# Patient Record
Sex: MALE | Race: ASIAN | NOT HISPANIC OR LATINO | URBAN - METROPOLITAN AREA
[De-identification: names, ages, dates, MRNs, and addresses within clinical notes are randomized per-mention and may not be internally consistent; named-entity substitution may affect disease eponyms.]

---

## 2017-04-19 ENCOUNTER — EMERGENCY (EMERGENCY)
Facility: HOSPITAL | Age: 57
LOS: 1 days | Discharge: HOME | End: 2017-04-19
Admitting: EMERGENCY MEDICINE
Payer: MEDICAID

## 2017-04-19 VITALS
RESPIRATION RATE: 16 BRPM | HEART RATE: 57 BPM | SYSTOLIC BLOOD PRESSURE: 139 MMHG | DIASTOLIC BLOOD PRESSURE: 92 MMHG | TEMPERATURE: 98 F | OXYGEN SATURATION: 98 %

## 2017-04-19 VITALS
DIASTOLIC BLOOD PRESSURE: 100 MMHG | RESPIRATION RATE: 18 BRPM | SYSTOLIC BLOOD PRESSURE: 157 MMHG | HEART RATE: 66 BPM | OXYGEN SATURATION: 100 % | TEMPERATURE: 98 F

## 2017-04-19 LAB
ALBUMIN SERPL ELPH-MCNC: 4.4 G/DL — SIGNIFICANT CHANGE UP (ref 3.3–5)
ALP SERPL-CCNC: 65 U/L — SIGNIFICANT CHANGE UP (ref 40–120)
ALT FLD-CCNC: 34 U/L — SIGNIFICANT CHANGE UP (ref 4–41)
APPEARANCE UR: CLEAR — SIGNIFICANT CHANGE UP
AST SERPL-CCNC: 35 U/L — SIGNIFICANT CHANGE UP (ref 4–40)
BASE EXCESS BLDV CALC-SCNC: 4.4 MMOL/L — SIGNIFICANT CHANGE UP
BASOPHILS # BLD AUTO: 0.02 K/UL — SIGNIFICANT CHANGE UP (ref 0–0.2)
BASOPHILS NFR BLD AUTO: 0.3 % — SIGNIFICANT CHANGE UP (ref 0–2)
BILIRUB SERPL-MCNC: 0.4 MG/DL — SIGNIFICANT CHANGE UP (ref 0.2–1.2)
BILIRUB UR-MCNC: NEGATIVE — SIGNIFICANT CHANGE UP
BLOOD GAS VENOUS - CREATININE: 1 MG/DL — SIGNIFICANT CHANGE UP (ref 0.5–1.3)
BLOOD UR QL VISUAL: NEGATIVE — SIGNIFICANT CHANGE UP
BUN SERPL-MCNC: 15 MG/DL — SIGNIFICANT CHANGE UP (ref 7–23)
CALCIUM SERPL-MCNC: 9.8 MG/DL — SIGNIFICANT CHANGE UP (ref 8.4–10.5)
CHLORIDE BLDV-SCNC: 109 MMOL/L — HIGH (ref 96–108)
CHLORIDE SERPL-SCNC: 99 MMOL/L — SIGNIFICANT CHANGE UP (ref 98–107)
CO2 SERPL-SCNC: 25 MMOL/L — SIGNIFICANT CHANGE UP (ref 22–31)
COLOR SPEC: SIGNIFICANT CHANGE UP
CREAT SERPL-MCNC: 1.1 MG/DL — SIGNIFICANT CHANGE UP (ref 0.5–1.3)
EOSINOPHIL # BLD AUTO: 0.14 K/UL — SIGNIFICANT CHANGE UP (ref 0–0.5)
EOSINOPHIL NFR BLD AUTO: 2 % — SIGNIFICANT CHANGE UP (ref 0–6)
GAS PNL BLDV: 133 MMOL/L — LOW (ref 136–146)
GLUCOSE BLDV-MCNC: 87 — SIGNIFICANT CHANGE UP (ref 70–99)
GLUCOSE SERPL-MCNC: 84 MG/DL — SIGNIFICANT CHANGE UP (ref 70–99)
GLUCOSE UR-MCNC: NEGATIVE — SIGNIFICANT CHANGE UP
HCO3 BLDV-SCNC: 27 MMOL/L — SIGNIFICANT CHANGE UP (ref 20–27)
HCT VFR BLD CALC: 49.6 % — SIGNIFICANT CHANGE UP (ref 39–50)
HCT VFR BLDV CALC: 52.1 % — HIGH (ref 39–51)
HGB BLD-MCNC: 16.5 G/DL — SIGNIFICANT CHANGE UP (ref 13–17)
HGB BLDV-MCNC: 17 G/DL — SIGNIFICANT CHANGE UP (ref 13–17)
IMM GRANULOCYTES NFR BLD AUTO: 0.1 % — SIGNIFICANT CHANGE UP (ref 0–1.5)
KETONES UR-MCNC: NEGATIVE — SIGNIFICANT CHANGE UP
LACTATE BLDV-MCNC: 2.1 MMOL/L — HIGH (ref 0.5–2)
LEUKOCYTE ESTERASE UR-ACNC: NEGATIVE — SIGNIFICANT CHANGE UP
LIDOCAIN IGE QN: 57 U/L — SIGNIFICANT CHANGE UP (ref 7–60)
LYMPHOCYTES # BLD AUTO: 2.26 K/UL — SIGNIFICANT CHANGE UP (ref 1–3.3)
LYMPHOCYTES # BLD AUTO: 31.8 % — SIGNIFICANT CHANGE UP (ref 13–44)
MCHC RBC-ENTMCNC: 28 PG — SIGNIFICANT CHANGE UP (ref 27–34)
MCHC RBC-ENTMCNC: 33.3 % — SIGNIFICANT CHANGE UP (ref 32–36)
MCV RBC AUTO: 84.1 FL — SIGNIFICANT CHANGE UP (ref 80–100)
MONOCYTES # BLD AUTO: 0.6 K/UL — SIGNIFICANT CHANGE UP (ref 0–0.9)
MONOCYTES NFR BLD AUTO: 8.4 % — SIGNIFICANT CHANGE UP (ref 2–14)
NEUTROPHILS # BLD AUTO: 4.08 K/UL — SIGNIFICANT CHANGE UP (ref 1.8–7.4)
NEUTROPHILS NFR BLD AUTO: 57.4 % — SIGNIFICANT CHANGE UP (ref 43–77)
NITRITE UR-MCNC: NEGATIVE — SIGNIFICANT CHANGE UP
PCO2 BLDV: 49 MMHG — SIGNIFICANT CHANGE UP (ref 41–51)
PH BLDV: 7.39 PH — SIGNIFICANT CHANGE UP (ref 7.32–7.43)
PH UR: 6.5 — SIGNIFICANT CHANGE UP (ref 4.6–8)
PLATELET # BLD AUTO: 254 K/UL — SIGNIFICANT CHANGE UP (ref 150–400)
PMV BLD: 10.1 FL — SIGNIFICANT CHANGE UP (ref 7–13)
PO2 BLDV: 68 MMHG — HIGH (ref 35–40)
POTASSIUM BLDV-SCNC: 4.2 MMOL/L — SIGNIFICANT CHANGE UP (ref 3.4–4.5)
POTASSIUM SERPL-MCNC: 4.9 MMOL/L — SIGNIFICANT CHANGE UP (ref 3.5–5.3)
POTASSIUM SERPL-SCNC: 4.9 MMOL/L — SIGNIFICANT CHANGE UP (ref 3.5–5.3)
PROT SERPL-MCNC: 7.1 G/DL — SIGNIFICANT CHANGE UP (ref 6–8.3)
PROT UR-MCNC: NEGATIVE — SIGNIFICANT CHANGE UP
RBC # BLD: 5.9 M/UL — HIGH (ref 4.2–5.8)
RBC # FLD: 13 % — SIGNIFICANT CHANGE UP (ref 10.3–14.5)
RBC CASTS # UR COMP ASSIST: SIGNIFICANT CHANGE UP (ref 0–?)
SAO2 % BLDV: 92.4 % — HIGH (ref 60–85)
SODIUM SERPL-SCNC: 141 MMOL/L — SIGNIFICANT CHANGE UP (ref 135–145)
SP GR SPEC: 1.01 — SIGNIFICANT CHANGE UP (ref 1–1.03)
UROBILINOGEN FLD QL: NORMAL E.U. — SIGNIFICANT CHANGE UP (ref 0.1–0.2)
WBC # BLD: 7.11 K/UL — SIGNIFICANT CHANGE UP (ref 3.8–10.5)
WBC # FLD AUTO: 7.11 K/UL — SIGNIFICANT CHANGE UP (ref 3.8–10.5)
WBC UR QL: SIGNIFICANT CHANGE UP (ref 0–?)

## 2017-04-19 PROCEDURE — 99284 EMERGENCY DEPT VISIT MOD MDM: CPT

## 2017-04-19 PROCEDURE — 74177 CT ABD & PELVIS W/CONTRAST: CPT | Mod: 26

## 2017-04-19 RX ORDER — KETOROLAC TROMETHAMINE 30 MG/ML
15 SYRINGE (ML) INJECTION ONCE
Qty: 0 | Refills: 0 | Status: DISCONTINUED | OUTPATIENT
Start: 2017-04-19 | End: 2017-04-19

## 2017-04-19 RX ORDER — SODIUM CHLORIDE 9 MG/ML
1000 INJECTION INTRAMUSCULAR; INTRAVENOUS; SUBCUTANEOUS ONCE
Qty: 0 | Refills: 0 | Status: COMPLETED | OUTPATIENT
Start: 2017-04-19 | End: 2017-04-19

## 2017-04-19 RX ADMIN — Medication 15 MILLIGRAM(S): at 17:59

## 2017-04-19 RX ADMIN — Medication 15 MILLIGRAM(S): at 20:22

## 2017-04-19 RX ADMIN — SODIUM CHLORIDE 1000 MILLILITER(S): 9 INJECTION INTRAMUSCULAR; INTRAVENOUS; SUBCUTANEOUS at 20:22

## 2017-04-19 NOTE — ED PROVIDER NOTE - OBJECTIVE STATEMENT
56 yo M PMHx of HTN, HLD, here for lower abdominal pain x 2 days. Pt reports for the past two days he has had lower abdominal pain and mild dysuria. He went to see his PMD 5 days ago who rx'ed metronidazole and tetracycline s/p a +breath test for h. pylori, also gave miralax for constipation. Pt states he has been able to since have bowel movements. Last BM was this AM which was normal. Pt reports pain feels like burning, intermittent. Non radiating. No meds taken to help the pain. Pt also mentions that he was off from work () for days and went back to work yesterday and this morning woke up with a "sore lower back". Pt reports this has happened in the past with work and typically resolves on its own. Denies  fever, chills, vomiting, diarrhea, weakness, HA, CP, SOB,  hematuria, blood in stool, dizziness, weakness. 58 yo M PMHx of HTN, HLD, here for lower abdominal pain x 2 days. Pt reports for the past two days he has had lower abdominal pain, mostly on the lower R side, and mild dysuria. He went to see his PMD 5 days ago who rx'ed metronidazole and tetracycline s/p a +breath test for h. pylori, also gave miralax for constipation. Pt states he has been able to since have bowel movements. Last BM was this AM which was normal. Pt reports pain feels like burning, intermittent, and mostly on the right lower side of his abdomen. Non radiating. No meds taken to help the pain. Pt also mentions that he was off from work () for days and went back to work yesterday and this morning woke up with a "sore lower back". Pt reports this has happened in the past with work and typically resolves on its own. Denies  fever, chills, vomiting, diarrhea, weakness, HA, CP, SOB,  hematuria, blood in stool, dizziness, weakness.

## 2017-04-19 NOTE — ED PROVIDER NOTE - GASTROINTESTINAL, MLM
mild suprapubic tenderness, Abdomen COMPLETELY soft, non-tender, no guarding. BS x 4+ mild suprapubic tenderness, Abdomen COMPLETELY soft, minimal RLQ and LLQ tenderness, no guarding. BS x 4+

## 2017-04-19 NOTE — ED ADULT TRIAGE NOTE - CHIEF COMPLAINT QUOTE
pt saw pmd for constipation last week and was given miralax with good results--pt c/o lower abdominal pain and elysia he has infection  in his intestine and was given metronidazole and tetracycline--pt states he still has lower abdominal pain -mainly on right side

## 2017-04-19 NOTE — ED PROVIDER NOTE - CARE PLAN
Principal Discharge DX:	Abdominal pain  Instructions for follow-up, activity and diet:	Follow up with gastroenterology within 48 hours, referral list given. Take copy of results with you. Return to ER for fever, chills, worsening pain, nausea, vomiting, diarrhea, bloody stools or urine, weakness or any other concerns. Principal Discharge DX:	Abdominal pain  Instructions for follow-up, activity and diet:	Follow up with gastroenterology within 48 hours, referral list given. Continue taking your medications  as prescribed to you by your physician. Take copy of results with you. Return to ER for fever, chills, worsening pain, nausea, vomiting, diarrhea, bloody stools or urine, weakness or any other concerns.

## 2017-04-19 NOTE — ED PROVIDER NOTE - PROGRESS NOTE DETAILS
VALERIE Sexton: labs reassuring, pt doing well. pending CT. pt will be signed out to overnight PA. VALERIE Espinoza - Received sign out from VALERIE Sexton. Awaiting CT results. VALERIE Espinoza - Received sign out from VALERIE Sexton. Awaiting CT results. Pt in NAD, resting comfortably in RW. Abdomen: non-tender, non-distending, no guarding, no rebound, no CVA tenderness. denies any dysuria, chills. Will continue to follow. PA Gerasimou - CT negative. denies any abdominal pain, N/V/D, urinary complaints. exam benign. pt states he is amenable for d/c home with GI follow up. VALERIE Najera agrees stable for d/c.

## 2017-04-19 NOTE — ED PROVIDER NOTE - MUSCULOSKELETAL, MLM
Back non-tender, no midline tenderness, no palpable step offs, FROM, able to ambulate, Spine appears normal, range of motion is not limited, no muscle or joint tenderness

## 2017-04-19 NOTE — ED PROVIDER NOTE - PHYSICAL EXAMINATION
NEURO: EOMi, PERRLA, visual fields intact, tongue midline, negative romberg, strength 5/5 UE and LE bilaterally, normal gait, facial expressions intact, point to point intact, rapid alternating movements intact, sensate intact to UE and LE bilaterally. NVI. Normal gait.

## 2017-04-19 NOTE — ED PROVIDER NOTE - PLAN OF CARE
Follow up with gastroenterology within 48 hours, referral list given. Take copy of results with you. Return to ER for fever, chills, worsening pain, nausea, vomiting, diarrhea, bloody stools or urine, weakness or any other concerns. Follow up with gastroenterology within 48 hours, referral list given. Continue taking your medications  as prescribed to you by your physician. Take copy of results with you. Return to ER for fever, chills, worsening pain, nausea, vomiting, diarrhea, bloody stools or urine, weakness or any other concerns.

## 2017-04-19 NOTE — ED PROVIDER NOTE - MEDICAL DECISION MAKING DETAILS
58 yo M pmhx of htn hld here for suprapubic pain and dysuria. Pt otherwise doing well. CC for abdominal pain however on exam patients abdomen is benign but he has mild suprapubic tenderness. NO RLQ tenderness, no systemic symptoms. Back pain is mild, ambulating in ED, no neuro deficits or injury, no fever. PLAN: labs, urine, reassess.

## 2017-04-19 NOTE — ED ADULT NURSE REASSESSMENT NOTE - SYMPTOMS
states abd pain is better now "mild", abd. slightly firm and non tender, states pain comes and goes in RLQ. pt also c.o. back pain separate from abd pain. denies fevers, vomiting and diarrhea. vs as documented. given gastroview in prep for CT/abdominal pain

## 2017-04-21 LAB
BACTERIA UR CULT: SIGNIFICANT CHANGE UP
SPECIMEN SOURCE: SIGNIFICANT CHANGE UP

## 2018-11-19 ENCOUNTER — EMERGENCY (EMERGENCY)
Facility: HOSPITAL | Age: 58
LOS: 1 days | Discharge: HOME | End: 2018-11-19
Attending: EMERGENCY MEDICINE | Admitting: EMERGENCY MEDICINE
Payer: MEDICAID

## 2018-11-19 VITALS
RESPIRATION RATE: 16 BRPM | HEART RATE: 67 BPM | SYSTOLIC BLOOD PRESSURE: 177 MMHG | DIASTOLIC BLOOD PRESSURE: 90 MMHG | TEMPERATURE: 98 F | OXYGEN SATURATION: 100 %

## 2018-11-19 LAB
ALBUMIN SERPL ELPH-MCNC: 4.3 G/DL — SIGNIFICANT CHANGE UP (ref 3.3–5)
ALP SERPL-CCNC: 72 U/L — SIGNIFICANT CHANGE UP (ref 40–120)
ALT FLD-CCNC: 27 U/L — SIGNIFICANT CHANGE UP (ref 4–41)
AST SERPL-CCNC: 21 U/L — SIGNIFICANT CHANGE UP (ref 4–40)
BASOPHILS # BLD AUTO: 0.04 K/UL — SIGNIFICANT CHANGE UP (ref 0–0.2)
BASOPHILS NFR BLD AUTO: 0.5 % — SIGNIFICANT CHANGE UP (ref 0–2)
BILIRUB SERPL-MCNC: 0.4 MG/DL — SIGNIFICANT CHANGE UP (ref 0.2–1.2)
BUN SERPL-MCNC: 15 MG/DL — SIGNIFICANT CHANGE UP (ref 7–23)
CALCIUM SERPL-MCNC: 9.5 MG/DL — SIGNIFICANT CHANGE UP (ref 8.4–10.5)
CHLORIDE SERPL-SCNC: 103 MMOL/L — SIGNIFICANT CHANGE UP (ref 98–107)
CO2 SERPL-SCNC: 25 MMOL/L — SIGNIFICANT CHANGE UP (ref 22–31)
CREAT SERPL-MCNC: 0.98 MG/DL — SIGNIFICANT CHANGE UP (ref 0.5–1.3)
EOSINOPHIL # BLD AUTO: 0.17 K/UL — SIGNIFICANT CHANGE UP (ref 0–0.5)
EOSINOPHIL NFR BLD AUTO: 2.2 % — SIGNIFICANT CHANGE UP (ref 0–6)
GLUCOSE SERPL-MCNC: 117 MG/DL — HIGH (ref 70–99)
HCT VFR BLD CALC: 51.9 % — HIGH (ref 39–50)
HGB BLD-MCNC: 16.7 G/DL — SIGNIFICANT CHANGE UP (ref 13–17)
IMM GRANULOCYTES # BLD AUTO: 0.02 # — SIGNIFICANT CHANGE UP
IMM GRANULOCYTES NFR BLD AUTO: 0.3 % — SIGNIFICANT CHANGE UP (ref 0–1.5)
LYMPHOCYTES # BLD AUTO: 2.18 K/UL — SIGNIFICANT CHANGE UP (ref 1–3.3)
LYMPHOCYTES # BLD AUTO: 28.2 % — SIGNIFICANT CHANGE UP (ref 13–44)
MCHC RBC-ENTMCNC: 27.3 PG — SIGNIFICANT CHANGE UP (ref 27–34)
MCHC RBC-ENTMCNC: 32.2 % — SIGNIFICANT CHANGE UP (ref 32–36)
MCV RBC AUTO: 84.8 FL — SIGNIFICANT CHANGE UP (ref 80–100)
MONOCYTES # BLD AUTO: 0.57 K/UL — SIGNIFICANT CHANGE UP (ref 0–0.9)
MONOCYTES NFR BLD AUTO: 7.4 % — SIGNIFICANT CHANGE UP (ref 2–14)
NEUTROPHILS # BLD AUTO: 4.74 K/UL — SIGNIFICANT CHANGE UP (ref 1.8–7.4)
NEUTROPHILS NFR BLD AUTO: 61.4 % — SIGNIFICANT CHANGE UP (ref 43–77)
NRBC # FLD: 0 — SIGNIFICANT CHANGE UP
PLATELET # BLD AUTO: 274 K/UL — SIGNIFICANT CHANGE UP (ref 150–400)
PMV BLD: 10.2 FL — SIGNIFICANT CHANGE UP (ref 7–13)
POTASSIUM SERPL-MCNC: 3.8 MMOL/L — SIGNIFICANT CHANGE UP (ref 3.5–5.3)
POTASSIUM SERPL-SCNC: 3.8 MMOL/L — SIGNIFICANT CHANGE UP (ref 3.5–5.3)
PROT SERPL-MCNC: 7.4 G/DL — SIGNIFICANT CHANGE UP (ref 6–8.3)
RBC # BLD: 6.12 M/UL — HIGH (ref 4.2–5.8)
RBC # FLD: 12.6 % — SIGNIFICANT CHANGE UP (ref 10.3–14.5)
SODIUM SERPL-SCNC: 142 MMOL/L — SIGNIFICANT CHANGE UP (ref 135–145)
WBC # BLD: 7.72 K/UL — SIGNIFICANT CHANGE UP (ref 3.8–10.5)
WBC # FLD AUTO: 7.72 K/UL — SIGNIFICANT CHANGE UP (ref 3.8–10.5)

## 2018-11-19 PROCEDURE — 70498 CT ANGIOGRAPHY NECK: CPT | Mod: 26

## 2018-11-19 PROCEDURE — 99284 EMERGENCY DEPT VISIT MOD MDM: CPT

## 2018-11-19 PROCEDURE — 70496 CT ANGIOGRAPHY HEAD: CPT | Mod: 26

## 2018-11-19 NOTE — ED ADULT TRIAGE NOTE - CHIEF COMPLAINT QUOTE
pt comes to ED for HA and dizziness  x 3 days. pt states when he lays down he feels like the room is spinning. pt has hx of HTN pt took BP meds today. pt complains of blurry vision. pt BP elevated otherwise VSS

## 2018-11-19 NOTE — ED PROVIDER NOTE - OBJECTIVE STATEMENT
58 year old male with htn dm hld presents with 3 days of vision changes and right neck pain. Was playing with son when he turned his neck right and began feeling pain 58 year old male with htn dm hld presents with 3 days of vision changes and right neck pain. Was playing with son when he turned his neck right and began feeling pain in his right neck. He also began having 10-15 second episodes of "room spinning" vision that would self resolve. These sx continued for 3 days whenever moving his neck, and began having persistent right neck pain today. First time having this pain, denies any recent trauma.    Denies any weakness, sensory changes, LOC. Takes his BP meds and is mostly compliant. 58 year old male with htn (on multiple meds, takes irregularly), dm, hld, presents with 3 days of dizziness when turning neck to right (not when turning with entire trunk). Was playing with son when he turned his neck right and began feeling pain in his right neck. He also began having 10-15 second episodes of "room spinning" vision that would self resolve after returning neck to neutral position. These sx continued for 3 days whenever moving his neck, and began having persistent right neck pain today. First time having this pain, denies any recent trauma. No LOC.  NO N/V/d.  No visino changes despite triage note. This has never happened before.

## 2018-11-19 NOTE — ED PROVIDER NOTE - PROGRESS NOTE DETAILS
Still asympt.  CT unrevealed except for spinal stenosis as reported.  Will refer to spine center for f/u. Still asympt.  CT unrevealed except for spinal stenosis as reported.  Will refer to spine center for f/u. also, to see neuro regarding vertigo.  Understands he may need further eval incl possibly an MRI.  Meclizine rx sent PRN.

## 2018-11-19 NOTE — ED ADULT NURSE NOTE - OBJECTIVE STATEMENT
pt received to intake room 6, ambulatory, c/o right sided headache and dizziness when turning head x3 days. pt states dizziness is worse in the morning, and worse when turning head to the right. pt states these dizzy spells are like "the room is spinning" and are accompanied by seeing multiple of the objects that are in front of him. pt denies nausea, chest pain, SOB. PMH HTN, states BP is not normally this high and he took his BP meds this AM. IV placed, labs sent, pending CT.

## 2018-11-19 NOTE — ED PROVIDER NOTE - ATTENDING CONTRIBUTION TO CARE
Attending Attestation: Dr. Jennings  I have personally performed a history and physical examination of the patient and discussed management with the resident as well as the patient.  I reviewed the resident's note and agree with the documented findings and plan of care.  I have authored and modified critical sections of the Provider Note, including but not limited to HPI, Physical Exam and MDM. 58 year old male with poorly controlled HTN HLD DM presenting with right neck pain and room spinning sensation. Suggestive of BPPV, but need to evaluate for possible vertebral artery dissection given highly positional and reproducible findings after turning neck. Would CTA neck and head, due to nature of pain and neuro changes. Will get basic labs. Reassess  . Asymptomatic at present.

## 2018-11-19 NOTE — ED PROVIDER NOTE - MEDICAL DECISION MAKING DETAILS
58 year old male with HTN HLD DM presenting with right neck pain and room spinning blurry vision. Suggestive of BPPV, but need to evaluate for possible carotid artery dissection with CTa neck and head, due to nature of pain and neuro changes. Will get basic labs. If negative, will give meclizine for BPPV with outpatient f/u. 58 year old male with poorly controlled HTN HLD DM presenting with right neck pain and room spinning sensation. Suggestive of BPPV, but need to evaluate for possible vertebral artery dissection given highly positional and reproducible findings after turning neck. Would CTA neck and head, due to nature of pain and neuro changes. Will get basic labs. Reassess  . Asymptomatic at present.

## 2018-11-20 VITALS
TEMPERATURE: 98 F | RESPIRATION RATE: 18 BRPM | OXYGEN SATURATION: 100 % | SYSTOLIC BLOOD PRESSURE: 150 MMHG | HEART RATE: 60 BPM | DIASTOLIC BLOOD PRESSURE: 90 MMHG

## 2018-11-20 RX ORDER — MECLIZINE HCL 12.5 MG
1 TABLET ORAL
Qty: 10 | Refills: 0 | OUTPATIENT
Start: 2018-11-20

## 2022-07-18 ENCOUNTER — EMERGENCY (EMERGENCY)
Facility: HOSPITAL | Age: 62
LOS: 1 days | Discharge: HOME | End: 2022-07-18
Attending: EMERGENCY MEDICINE | Admitting: EMERGENCY MEDICINE

## 2022-07-18 VITALS
TEMPERATURE: 98 F | SYSTOLIC BLOOD PRESSURE: 154 MMHG | OXYGEN SATURATION: 98 % | DIASTOLIC BLOOD PRESSURE: 82 MMHG | RESPIRATION RATE: 18 BRPM | HEART RATE: 79 BPM

## 2022-07-18 VITALS
DIASTOLIC BLOOD PRESSURE: 92 MMHG | RESPIRATION RATE: 16 BRPM | TEMPERATURE: 98 F | SYSTOLIC BLOOD PRESSURE: 137 MMHG | HEART RATE: 54 BPM | OXYGEN SATURATION: 100 %

## 2022-07-18 LAB
A1C WITH ESTIMATED AVERAGE GLUCOSE RESULT: 6.1 % — HIGH (ref 4–5.6)
ALBUMIN SERPL ELPH-MCNC: 4.4 G/DL — SIGNIFICANT CHANGE UP (ref 3.3–5)
ALP SERPL-CCNC: 65 U/L — SIGNIFICANT CHANGE UP (ref 40–120)
ALT FLD-CCNC: 38 U/L — SIGNIFICANT CHANGE UP (ref 4–41)
ANION GAP SERPL CALC-SCNC: 11 MMOL/L — SIGNIFICANT CHANGE UP (ref 7–14)
APTT BLD: 33.2 SEC — SIGNIFICANT CHANGE UP (ref 27–36.3)
AST SERPL-CCNC: 24 U/L — SIGNIFICANT CHANGE UP (ref 4–40)
BILIRUB SERPL-MCNC: 0.3 MG/DL — SIGNIFICANT CHANGE UP (ref 0.2–1.2)
BUN SERPL-MCNC: 17 MG/DL — SIGNIFICANT CHANGE UP (ref 7–23)
CALCIUM SERPL-MCNC: 9.5 MG/DL — SIGNIFICANT CHANGE UP (ref 8.4–10.5)
CHLORIDE SERPL-SCNC: 103 MMOL/L — SIGNIFICANT CHANGE UP (ref 98–107)
CO2 SERPL-SCNC: 27 MMOL/L — SIGNIFICANT CHANGE UP (ref 22–31)
CREAT SERPL-MCNC: 1.09 MG/DL — SIGNIFICANT CHANGE UP (ref 0.5–1.3)
EGFR: 77 ML/MIN/1.73M2 — SIGNIFICANT CHANGE UP
ESTIMATED AVERAGE GLUCOSE: 128 — SIGNIFICANT CHANGE UP
GLUCOSE SERPL-MCNC: 106 MG/DL — HIGH (ref 70–99)
HCT VFR BLD CALC: 49.2 % — SIGNIFICANT CHANGE UP (ref 39–50)
HGB BLD-MCNC: 15.9 G/DL — SIGNIFICANT CHANGE UP (ref 13–17)
INR BLD: 0.91 RATIO — SIGNIFICANT CHANGE UP (ref 0.88–1.16)
MCHC RBC-ENTMCNC: 27.6 PG — SIGNIFICANT CHANGE UP (ref 27–34)
MCHC RBC-ENTMCNC: 32.3 GM/DL — SIGNIFICANT CHANGE UP (ref 32–36)
MCV RBC AUTO: 85.3 FL — SIGNIFICANT CHANGE UP (ref 80–100)
NRBC # BLD: 0 /100 WBCS — SIGNIFICANT CHANGE UP
NRBC # FLD: 0 K/UL — SIGNIFICANT CHANGE UP
PLATELET # BLD AUTO: 286 K/UL — SIGNIFICANT CHANGE UP (ref 150–400)
POTASSIUM SERPL-MCNC: 4.2 MMOL/L — SIGNIFICANT CHANGE UP (ref 3.5–5.3)
POTASSIUM SERPL-SCNC: 4.2 MMOL/L — SIGNIFICANT CHANGE UP (ref 3.5–5.3)
PROT SERPL-MCNC: 7.7 G/DL — SIGNIFICANT CHANGE UP (ref 6–8.3)
PROTHROM AB SERPL-ACNC: 10.5 SEC — SIGNIFICANT CHANGE UP (ref 10.5–13.4)
RBC # BLD: 5.77 M/UL — SIGNIFICANT CHANGE UP (ref 4.2–5.8)
RBC # FLD: 12.8 % — SIGNIFICANT CHANGE UP (ref 10.3–14.5)
SODIUM SERPL-SCNC: 141 MMOL/L — SIGNIFICANT CHANGE UP (ref 135–145)
TROPONIN T, HIGH SENSITIVITY RESULT: 11 NG/L — SIGNIFICANT CHANGE UP
TROPONIN T, HIGH SENSITIVITY RESULT: 12 NG/L — SIGNIFICANT CHANGE UP
WBC # BLD: 7.13 K/UL — SIGNIFICANT CHANGE UP (ref 3.8–10.5)
WBC # FLD AUTO: 7.13 K/UL — SIGNIFICANT CHANGE UP (ref 3.8–10.5)

## 2022-07-18 PROCEDURE — 93010 ELECTROCARDIOGRAM REPORT: CPT

## 2022-07-18 PROCEDURE — 99285 EMERGENCY DEPT VISIT HI MDM: CPT | Mod: 25

## 2022-07-18 RX ORDER — LIDOCAINE 4 G/100G
1 CREAM TOPICAL ONCE
Refills: 0 | Status: COMPLETED | OUTPATIENT
Start: 2022-07-18 | End: 2022-07-18

## 2022-07-18 RX ORDER — IBUPROFEN 200 MG
600 TABLET ORAL ONCE
Refills: 0 | Status: COMPLETED | OUTPATIENT
Start: 2022-07-18 | End: 2022-07-18

## 2022-07-18 RX ADMIN — LIDOCAINE 1 PATCH: 4 CREAM TOPICAL at 18:49

## 2022-07-18 RX ADMIN — Medication 600 MILLIGRAM(S): at 18:48

## 2022-07-18 NOTE — ED PROVIDER NOTE - OBJECTIVE STATEMENT
Pt is a 61 y/o Male w/ PMH of DM (on Metformin), HTN, HLD, p/w 1 week neck pain/stiffness. Pt describes pain as intermittent in nature. Denies trauma, numbness, tingling, and weakness to extremities and face. States he has not taken meds for pain at home. Also feels glucose higher than normal, and concern for his diabetes, presented himself to ED to get checked out. Of notes, Pt admits to weeks of fleeting chest pain non-exertional. Denies nausea, vomiting, diaphoresis, SOB, unilateral leg pain, and leg swelling. Pt is a 63 y/o Male w/ PMH of DM (on Metformin), HTN, HLD, p/w 1 week L>R neck pain/stiffness. Pt describes pain as intermittent in nature. Denies trauma, numbness, tingling, and weakness to extremities and face. States he has not taken meds for pain at home. Also feels glucose higher than normal (high 100s) and concern for his diabetes, presented himself to ED to get checked out. Of notes, Pt admits to weeks-months of fleeting L chest pain, non-exertional. Denies nausea, vomiting, diaphoresis, SOB, unilateral leg pain, and leg swelling.

## 2022-07-18 NOTE — ED PROVIDER NOTE - NSFOLLOWUPINSTRUCTIONS_ED_ALL_ED_FT
You blood tests were normal including your A1C, kidney and liver function, cardiac enzyme, EKG. Please follow up with your primary care doctor in the next week. You neck pain is likely doing to muscle stiffness/spasm. You can take tylenol or ibuprofen at home for your pain, you can also apply Icy Hot Cream and place a heating pad. If your pain worsens or you develop numbness, tingling or weakness to your extremities, please return to the ER immediately

## 2022-07-18 NOTE — ED PROVIDER NOTE - CHPI ED SYMPTOMS NEG
No nausea, No vomiting, No diaphoresis, No SOB, No unilateral leg pain, and No leg swelling./no numbness/no tingling

## 2022-07-18 NOTE — ED PROVIDER NOTE - NSICDXPASTMEDICALHX_GEN_ALL_CORE_FT
PAST MEDICAL HISTORY:  Arthritis     Hypertension       DM (diabetes mellitus)     HLD (hyperlipidemia)

## 2022-07-18 NOTE — ED ADULT NURSE NOTE - OBJECTIVE STATEMENT
received to wellness center room 1. c.o neck pain/stiffness x 1 week. intermittent. denies any trauma. denies numbness/tingling, headache, n/v or other complaints. appears comfortable. labs sent. 20 g Iv placed in left ac. medicated as ordered. awaiting results in nad

## 2022-07-18 NOTE — ED PROVIDER NOTE - CLINICAL SUMMARY MEDICAL DECISION MAKING FREE TEXT BOX
Pt is a 63 y/o Male w/ multiple comorbidities, 1 week intermittent Left greater than right neck pain and stiffness.  Normal neurological exam, no midline spinal tenderness, no sign of trauma, w/ full neck ROM..  Concern for muscle spasm.  Will provide analgesia.  Pt also complain of atypical left chest pain. Low suspicion for ACS, normal stress-test.  Given cardiac risk factors, Will check Troponin; EKG in Triage was normal. Pt is a 61 y/o Male w/ multiple comorbidities, 1 week intermittent Left greater than right neck pain and stiffness, atraumatic.   Normal neurological exam, no midline spinal tenderness, no sign of trauma, w/ full neck ROM, pain is only present with neck movement.   Concern for muscle spasm- will provide symptomatic relief  Pt also complain of atypical left chest pain. Low suspicion for ACS, normal stress-test 1 year ago, EKG Non ischemic here.   Given multiple cardiac risk factors, Will check Troponin.

## 2022-07-18 NOTE — ED PROVIDER NOTE - PATIENT PORTAL LINK FT
You can access the FollowMyHealth Patient Portal offered by [facility] by registering at the following website: [email or web address]. By joining [email or web address]’s FollowMyHealth portal, you will also be able to view your health information using other applications (apps) compatible with our system.

## 2022-07-18 NOTE — ED PROVIDER NOTE - PHYSICAL EXAMINATION
GEN - NAD; well appearing; A+O x3   HEAD - NC/AT   EYES- PERRL, EOMI  ENT: Airway patent, mmm, no midline neck TTP, full ROM of neck w/o paresthesias, notes a pain to b/l trapezius (L>R) with neck ext/flex, lateral movements  NECK: Neck supple  PULMONARY - CTA b/l, symmetric breath sounds.   CARDIAC -s1s2, RRR, no M,G,R  ABDOMEN - +BS, ND, NT, soft, no guarding, no rebound, no masses   BACK - no CVA tenderness, Normal  spine   EXTREMITIES - FROM, symmetric pulses, capillary refill < 2 seconds, no edema   SKIN - no rash or bruising   NEUROLOGIC - alert, speech clear, no focal deficits  PSYCH -nl mood/affect, nl insight.

## 2022-07-18 NOTE — ED ADULT NURSE NOTE - NSIMPLEMENTINTERV_GEN_ALL_ED
Implemented All Universal Safety Interventions:  [place] to call system. Call bell, personal items and telephone within reach. Instruct patient to call for assistance. Room bathroom lighting operational. Non-slip footwear when patient is off stretcher. Physically safe environment: no spills, clutter or unnecessary equipment. Stretcher in lowest position, wheels locked, appropriate side rails in place.

## 2022-10-05 NOTE — ED ADULT NURSE NOTE - NS ED NURSE RECORD ANOTHER HT AND WT
ice 20 minutes every other hour as needed, heat 15 minutes every other hour as needed and stretch as instructed at visit   No

## 2025-02-22 NOTE — ED ADULT TRIAGE NOTE - HEART RATE (BEATS/MIN)
The patient has been re-examined and I agree with the above assessment or I updated with my findings.
The patient has been re-examined and I agree with the above assessment or I updated with my findings.
66

## 2025-03-20 NOTE — ED ADULT NURSE NOTE - TEMPLATE
[facility] - [facility] (Hospital)  Discharge Note  Short Stay    Procedure(s) (LRB):  RELEASE, CARPAL TUNNEL (Right)  EXCISION, GANGLION CYST, WRIST (Right)  ARTHROTOMY, HAND RADIOCARPAL (Right)  OSTECTOMY, CARPAL BONE (Right)      OUTCOME: Patient tolerated treatment/procedure well without complication and is now ready for discharge.    DISPOSITION: Home or Self Care    FINAL DIAGNOSIS:  Right carpal tunnel syndrome, right wrist ganglion    FOLLOWUP: In clinic    DISCHARGE INSTRUCTIONS:    Discharge Procedure Orders   Diet general     Keep surgical extremity elevated     Ice to affected area     Lifting restrictions     No driving, operating heavy equipment or signing legal documents while taking pain medication.     Leave dressing on - Keep it clean, dry, and intact until clinic visit     Call MD for:  temperature >100.4     Call MD for:  persistent nausea and vomiting     Call MD for:  severe uncontrolled pain     Call MD for:  difficulty breathing, headache or visual disturbances     Call MD for:  redness, tenderness, or signs of infection (pain, swelling, redness, odor or green/yellow discharge around incision site)     Call MD for:  hives     Call MD for:  persistent dizziness or light-headedness     Call MD for:  extreme fatigue        
General